# Patient Record
(demographics unavailable — no encounter records)

---

## 2025-07-14 NOTE — PHYSICAL EXAM
[Chaperoned Physical Exam] : A chaperone was present in the examining room during all aspects of the physical examination. [MA] : MA [FreeTextEntry2] : [place] [Appropriately responsive] : appropriately responsive [Alert] : alert [No Acute Distress] : no acute distress [No Lymphadenopathy] : no lymphadenopathy [Soft] : soft [Non-tender] : non-tender [Non-distended] : non-distended [No HSM] : No HSM [No Lesions] : no lesions [No Mass] : no mass [Oriented x3] : oriented x3

## 2025-07-14 NOTE — HISTORY OF PRESENT ILLNESS
[Patient reported PAP Smear was normal] : Patient reported PAP Smear was normal [LMP unknown] : LMP unknown [N] : Patient does not use contraception [Y] : Positive pregnancy history [unknown] : Patient is unsure of the date of her LMP [Currently Active] : currently active [Men] : men [No] : No [FreeTextEntry1] : Chrissy Curran 38yo presents for confirmation of pregnancy.  IVF Transfer Date: 2025 day 5 embryo PGT tested LMP: unknown BILL; 2025 10wks   Patient is currently on Synthroid 75MCG daily, Estrogen and progesterone Desires repeat c/s  [PapSmeardate] : 11/1/2023 [PGHxTotal] : 5 [facility]

## 2025-07-14 NOTE — HISTORY OF PRESENT ILLNESS
[Patient reported PAP Smear was normal] : Patient reported PAP Smear was normal [LMP unknown] : LMP unknown [N] : Patient does not use contraception [Y] : Positive pregnancy history [unknown] : Patient is unsure of the date of her LMP [Currently Active] : currently active [Men] : men [No] : No [FreeTextEntry1] : Chrissy Curran 36yo presents for confirmation of pregnancy.  IVF Transfer Date: 2025 day 5 embryo PGT tested LMP: unknown BILL; 2025 10wks   Patient is currently on Synthroid 75MCG daily, Estrogen and progesterone Desires repeat c/s  [PapSmeardate] : 11/1/2023 [PGHxTotal] : 5 [facility] : [facility]